# Patient Record
Sex: FEMALE | Race: WHITE | Employment: OTHER | URBAN - METROPOLITAN AREA
[De-identification: names, ages, dates, MRNs, and addresses within clinical notes are randomized per-mention and may not be internally consistent; named-entity substitution may affect disease eponyms.]

---

## 2018-05-08 ENCOUNTER — MEDICAL CORRESPONDENCE (OUTPATIENT)
Dept: HEALTH INFORMATION MANAGEMENT | Facility: CLINIC | Age: 73
End: 2018-05-08

## 2018-05-08 DIAGNOSIS — I31.39 PERICARDIAL EFFUSION: Primary | ICD-10-CM

## 2018-05-08 NOTE — PROGRESS NOTES
Ordering provider Eligio Gregg MD from [facility]. Please fax results to [phone]

## 2018-05-31 ENCOUNTER — APPOINTMENT (OUTPATIENT)
Dept: ULTRASOUND IMAGING | Facility: CLINIC | Age: 73
End: 2018-05-31
Attending: EMERGENCY MEDICINE
Payer: COMMERCIAL

## 2018-05-31 ENCOUNTER — HOSPITAL ENCOUNTER (EMERGENCY)
Facility: CLINIC | Age: 73
Discharge: HOME | End: 2018-05-31
Attending: EMERGENCY MEDICINE | Admitting: EMERGENCY MEDICINE
Payer: COMMERCIAL

## 2018-05-31 ENCOUNTER — TELEPHONE (OUTPATIENT)
Dept: FAMILY MEDICINE | Facility: CLINIC | Age: 73
End: 2018-05-31

## 2018-05-31 VITALS
TEMPERATURE: 97.8 F | HEART RATE: 72 BPM | DIASTOLIC BLOOD PRESSURE: 68 MMHG | HEIGHT: 60 IN | BODY MASS INDEX: 25.01 KG/M2 | WEIGHT: 127.4 LBS | SYSTOLIC BLOOD PRESSURE: 122 MMHG | OXYGEN SATURATION: 94 % | RESPIRATION RATE: 14 BRPM

## 2018-05-31 DIAGNOSIS — I82.402 ACUTE DEEP VEIN THROMBOSIS (DVT) OF LEFT LOWER EXTREMITY, UNSPECIFIED VEIN (H): ICD-10-CM

## 2018-05-31 PROCEDURE — 99284 EMERGENCY DEPT VISIT MOD MDM: CPT | Mod: 25 | Performed by: EMERGENCY MEDICINE

## 2018-05-31 PROCEDURE — 93970 EXTREMITY STUDY: CPT

## 2018-05-31 PROCEDURE — 99284 EMERGENCY DEPT VISIT MOD MDM: CPT | Mod: Z6 | Performed by: EMERGENCY MEDICINE

## 2018-05-31 RX ORDER — MULTIVIT WITH MINERALS/LUTEIN
1000 TABLET ORAL
COMMUNITY

## 2018-05-31 RX ORDER — POTASSIUM CHLORIDE 1500 MG/1
20 TABLET, EXTENDED RELEASE ORAL DAILY
COMMUNITY
Start: 2018-05-27 | End: 2018-06-01

## 2018-05-31 RX ORDER — CARVEDILOL 12.5 MG/1
12.5 TABLET ORAL
COMMUNITY
Start: 2018-05-27

## 2018-05-31 RX ORDER — KETOROLAC TROMETHAMINE 5 MG/ML
1 SOLUTION OPHTHALMIC PRN
COMMUNITY

## 2018-05-31 RX ORDER — FUROSEMIDE 40 MG
40 TABLET ORAL DAILY
COMMUNITY
Start: 2018-05-27 | End: 2018-06-01

## 2018-05-31 RX ORDER — ACETAMINOPHEN 325 MG/1
325-650 TABLET ORAL
COMMUNITY
Start: 2014-10-29

## 2018-05-31 RX ORDER — DOCOSANOL 100 MG/G
CREAM TOPICAL PRN
COMMUNITY

## 2018-05-31 RX ORDER — LANOLIN ALCOHOL/MO/W.PET/CERES
3 CREAM (GRAM) TOPICAL
COMMUNITY

## 2018-05-31 RX ORDER — FEXOFENADINE HCL 60 MG/1
60 TABLET, FILM COATED ORAL DAILY PRN
COMMUNITY

## 2018-05-31 RX ORDER — FLUTICASONE PROPIONATE 50 MCG
1 SPRAY, SUSPENSION (ML) NASAL PRN
COMMUNITY

## 2018-05-31 RX ORDER — DIPHENHYDRAMINE HCL 25 MG
25 CAPSULE ORAL
COMMUNITY

## 2018-05-31 RX ORDER — VIT C/B6/B5/MAGNESIUM/HERB 173 50-5-6-5MG
500 CAPSULE ORAL DAILY
COMMUNITY

## 2018-05-31 RX ORDER — VALACYCLOVIR HYDROCHLORIDE 1 G/1
1 TABLET, FILM COATED ORAL 2 TIMES DAILY PRN
COMMUNITY

## 2018-05-31 NOTE — ED AVS SNAPSHOT
[facility] Emergency Department    [address] [place] [address]    Phone:  [phone]    Fax:  [phone]                                       Marie C Barthel   MRN: [id number]    Department:  [facility] Emergency Department   Date of Visit:  5/31/2018           Patient Information     Date Of Birth          1945        Your diagnoses for this visit were:     Acute deep vein thrombosis (DVT) of left lower extremity, unspecified vein (H)        You were seen by Casey Oliva MD.      Follow-up Information     Follow up with cardiology and pcp.    Why:  As planned, ER follow up      24 Hour Appointment Hotline       To make an appointment at any [place] clinic, call [phone] ([phone]). If you don't have a family doctor or clinic, we will help you find one. [place] clinics are conveniently located to serve the needs of you and your family.             Review of your medicines      START taking        Dose / Directions Last dose taken    rivaroxaban ANTICOAGULANT 15 MG Tabs tablet   Commonly known as:  XARELTO   Dose:  15 mg   Quantity:  42 tablet        Take 1 tablet (15 mg) by mouth 2 times daily (with meals)   Refills:  0          Our records show that you are taking the medicines listed below. If these are incorrect, please call your family doctor or clinic.        Dose / Directions Last dose taken    acetaminophen 325 MG tablet   Commonly known as:  TYLENOL   Dose:  325-650 mg        Take 325-650 mg by mouth   Refills:  0        ascorbic acid 1000 MG Tabs   Commonly known as:  vitamin C   Dose:  1000 mg        Take 1,000 mg by mouth   Refills:  0        carvedilol 12.5 MG tablet   Commonly known as:  COREG   Dose:  12.5 mg        Take 12.5 mg by mouth   Refills:  0        diphenhydrAMINE 25 MG capsule   Commonly known as:  BENADRYL   Dose:  25 mg        Take 25 mg by mouth nightly as needed   Refills:  0        docosanol 10 % Crea cream   Commonly  known as:  ABREVA        Apply topically as needed   Refills:  0        fexofenadine 60 MG tablet   Commonly known as:  ALLEGRA   Dose:  60 mg        Take 60 mg by mouth daily as needed   Refills:  0        fluticasone 50 MCG/ACT spray   Commonly known as:  FLONASE   Dose:  1 spray        Spray 1 spray into both nostrils as needed   Refills:  0        furosemide 40 MG tablet   Commonly known as:  LASIX   Dose:  40 mg        Take 40 mg by mouth daily   Refills:  0        ketorolac 0.5 % ophthalmic solution   Commonly known as:  ACULAR   Dose:  1 drop        Place 1 drop into both eyes as needed   Refills:  0        melatonin 3 MG tablet   Dose:  3 mg        Take 3 mg by mouth nightly as needed   Refills:  0        potassium chloride SA 20 MEQ CR tablet   Commonly known as:  K-DUR/KLOR-CON M   Dose:  20 mEq        Take 20 mEq by mouth daily   Refills:  0        PRESERVISION AREDS PO   Dose:  1 tablet        Take 1 tablet by mouth daily   Refills:  0        PROBIOTIC DAILY PO   Dose:  1 tablet        Take 1 tablet by mouth as needed   Refills:  0        Turmeric 500 MG Caps   Dose:  500 mg        Take 500 mg by mouth daily   Refills:  0        valACYclovir 1000 mg tablet   Commonly known as:  VALTREX   Dose:  1 g        Take 1 g by mouth 2 times daily as needed   Refills:  0        VALERIAN PO        Take by mouth as needed (sleep)   Refills:  0        VITAMIN D-1000 MAX ST 1000 units Tabs   Dose:  1000 Units   Generic drug:  cholecalciferol        Take 1,000 Units by mouth   Refills:  0        ZINC PO   Dose:  1 capsule        Take 1 capsule by mouth daily   Refills:  0                Prescriptions were sent or printed at these locations (1 Prescription)                   [facility] - [place], [place] - [address]   [address] [facility] [address]    Telephone:  [phone]   Fax:  [phone]   Hours:                  E-Prescribed (1 of 1)         rivaroxaban ANTICOAGULANT (XARELTO) 15 MG TABS tablet           "      Procedures and tests performed during your visit     US Lower Extremity Venous Duplex Bilateral      Orders Needing Specimen Collection     None      Pending Results     No orders found from 2018 to 2018.            Pending Culture Results     No orders found from 2018 to 2018.            Pending Results Instructions     If you had any lab results that were not finalized at the time of your Discharge, you can call the ED Lab Result RN at [phone]. You will be contacted by this team for any positive Lab results or changes in treatment. The nurses are available 7 days a week from 10A to 6:30P.  You can leave a message 24 hours per day and they will return your call.        Thank you for choosing [facility]       Thank you for choosing [facility] for your care. Our goal is always to provide you with excellent care. Hearing back from our patients is one way we can continue to improve our services. Please take a few minutes to complete the written survey that you may receive in the mail after you visit with us. Thank you!        [employer] Information     [employer] lets you send messages to your doctor, view your test results, renew your prescriptions, schedule appointments and more. To sign up, go to [email or web address] . Click on \"Log in\" on the left side of the screen, which will take you to the Welcome page. Then click on \"Sign up Now\" on the right side of the page.     You will be asked to enter the access code listed below, as well as some personal information. Please follow the directions to create your username and password.     Your access code is: [email or web address]  Expires: 2018  1:27 PM     Your access code will  in 90 days. If you need help or a new code, please call your [facility] clinic or [phone].        Care EveryWhere ID     This is your Care EveryWhere ID. This could be used by other organizations to access your [facility] medical records  [id number]        Equal Access " to Services     JENNY ELLIOTT : Kristi Cortes, kevin christianson, arianne mccrary. So [facility] [phone].    ATENCIÓN: Si habla español, tiene a stuart disposición servicios gratuitos de asistencia lingüística. Llame al [phone].    We comply with applicable federal civil rights laws and [place] laws. We do not discriminate on the basis of race, color, national origin, age, disability, sex, sexual orientation, or gender identity.            After Visit Summary       This is your record. Keep this with you and show to your community pharmacist(s) and doctor(s) at your next visit.

## 2018-05-31 NOTE — ED PROVIDER NOTES
History     Chief Complaint   Patient presents with     Leg Pain     HPI  Marie C Barthel is a 72 year old female who resents the emergency department complaining of bilateral calf pain.  One week ago she had aortic valve and proximal aortic arch replacement secondary to an aortic aneurysm.  The surgery went well and she is tolerated her postoperative symptoms quite well.  She is does not have any chest pain and is getting around quite well.  She has been fairly active considering her recent surgery.  In the last couple of days she developed left calf pain followed by right calf pain.  Her daughters taking care of her and thought she felt a ropelike mass bilaterally.  She has not had any swelling or discoloration of her legs.  She has not had any sensory changes.  She has no abdominal pain, nausea or vomiting.  She has not had any fever or other new symptoms.  They are here because they are concerned about a clot.  She is not on any blood thinners.    Problem List:    There are no active problems to display for this patient.       Past Medical History:    Past Medical History:   Diagnosis Date     Brain aneurysm 2012     Giant cell arteritis (H)        Past Surgical History:    Past Surgical History:   Procedure Laterality Date     CRANIOTOMY, REPAIR ANEURYSM, COMBINED  2012    [facility]      [facility]       SUSANNA  2014    [facility]      Replacement of aortic valve       Replacement of ascending aortic arch       SURGICAL HISTORY OF -       eye surgery for strabismus     TONSILLECTOMY         Family History:    No family history on file.    Social History:  Marital Status:    Social History   Substance Use Topics     Smoking status: Never Smoker     Smokeless tobacco: Never Used     Alcohol use Not on file        Medications:      acetaminophen (TYLENOL) 325 MG tablet   ascorbic acid (VITAMIN C) 1000 MG TABS   carvedilol (COREG) 12.5 MG tablet   cholecalciferol (VITAMIN D-1000 MAX ST) 1000 units TABS   furosemide (LASIX)  40 MG tablet   Multiple Vitamins-Minerals (PRESERVISION AREDS PO)   Multiple Vitamins-Minerals (ZINC PO)   potassium chloride SA (K-DUR/KLOR-CON M) 20 MEQ CR tablet   Probiotic Product (PROBIOTIC DAILY PO)   rivaroxaban ANTICOAGULANT (XARELTO) 15 MG TABS tablet   Turmeric 500 MG CAPS   diphenhydrAMINE (BENADRYL) 25 MG capsule   docosanol (ABREVA) 10 % CREA cream   fexofenadine (ALLEGRA) 60 MG tablet   fluticasone (FLONASE) 50 MCG/ACT spray   ketorolac (ACULAR) 0.5 % ophthalmic solution   melatonin 3 MG tablet   valACYclovir (VALTREX) 1000 mg tablet   VALERIAN PO         Review of Systems   All other systems reviewed and are negative.      Physical Exam   BP: 114/65  Pulse: 77  Temp: 97.8  F (36.6  C)  Resp: 14  Height: 152.4 cm (5')  Weight: 57.8 kg (127 lb 6.4 oz)  SpO2: 93 %      Physical Exam   Constitutional: She appears well-developed and well-nourished. No distress.   HENT:   Head: Normocephalic and atraumatic.   Nose: Nose normal.   Mouth/Throat: Oropharynx is clear and moist. No oropharyngeal exudate.   Eyes: Conjunctivae and EOM are normal. Right eye exhibits no discharge. Left eye exhibits no discharge. No scleral icterus.   Neck: Normal range of motion. Neck supple.   Cardiovascular: Normal rate and normal heart sounds.  Exam reveals no gallop and no friction rub.    No murmur heard.  Pulmonary/Chest: Effort normal and breath sounds normal. No stridor. No respiratory distress.   Abdominal: Soft. She exhibits no distension. There is no tenderness. There is no rebound.   Musculoskeletal: Normal range of motion. She exhibits tenderness. She exhibits no edema or deformity.   Mild bilateral calf tenderness without swelling.  There is a suggestion of a subtle ropelike firmness to the right calf but nothing palpable on the left calf.   Neurological: She is alert. No cranial nerve deficit. Coordination normal.   Skin: Skin is warm and dry. No rash noted. She is not diaphoretic. No erythema. No pallor.    Psychiatric: She has a normal mood and affect. Her behavior is normal.   Nursing note and vitals reviewed.      ED Course     ED Course     Procedures                   Results for orders placed or performed during the hospital encounter of 05/31/18 (from the past 24 hour(s))   US Lower Extremity Venous Duplex Bilateral    Narrative    VENOUS ULTRASOUND BILATERAL LEG(S)  5/31/2018 12:03 PM     HISTORY: Calf pain post op aortic valve and arch replacement.     COMPARISON: None.    FINDINGS: Examination of the deep veins with graded compression and  color flow Doppler with spectral wave form analysis was performed.    Left: There is deep vein thrombosis in the left popliteal and  posterior tibial veins. No deep vein thrombosis noted in the left  common femoral or femoral veins.    Right: Images show no evidence of thrombus in the right common femoral  vein, femoral vein, popliteal vein or calf veins.      Impression    IMPRESSION:   1. Deep vein thrombosis in the left popliteal and posterior tibial  veins.  2. No deep vein thrombosis in the right lower extremity.    LACHELLE SLADE, DO       Medications - No data to display    Assessments & Plan (with Medical Decision Making)  72-year-old postoperative approximately 1 week for aortic valve and proximal aortic arch repair presents with leg pain and a popliteal DVT on ultrasound.  I had a long discussion with the patient and her daughter regarding the risks, treatment options and follow-up regarding this DVT.  I discussed the case with her cardiothoracic surgeon who agreed with the management outlined below.  Options include treating her with Lovenox and Coumadin, Xarelto, Pradaxa or apixaban.  After risks and benefits were discussed with the patient including reversal agents, storeability and cost we decided to proceed with Xarelto.  She understands there are significant risks to taking this blood thinner but also significant risks to no treatment.  I do not think  no treatment is reasonable option given the presence of the DVT.  She is artery been quite active.  She does not have a history of thromboembolic disease and therefore I think genetic disorder is extremely unlikely.  The initial 21 day prescription was sent electronically to [facility] locally per the patient request.  All questions were answered.     I have reviewed the nursing notes.    I have reviewed the findings, diagnosis, plan and need for follow up with the patient.      Discharge Medication List as of 5/31/2018  1:27 PM      START taking these medications    Details   rivaroxaban ANTICOAGULANT (XARELTO) 15 MG TABS tablet Take 1 tablet (15 mg) by mouth 2 times daily (with meals), Disp-42 tablet, R-0, E-Prescribe             Final diagnoses:   Acute deep vein thrombosis (DVT) of left lower extremity, unspecified vein (H)       5/31/2018   [facility] EMERGENCY DEPARTMENT     Casey Oliva MD  05/31/18 [id number]

## 2018-05-31 NOTE — ED NOTES
Bedside report received from Collette MOODY RN at this time and care assumed. Pain is minimal 1/10 at rest. Waiting for MD orders.

## 2018-05-31 NOTE — TELEPHONE ENCOUNTER
Patient is calling to report she is a family friend of Dr. Welch's and is in town at someone else's house.  She had open heart surgery on 5/23/18 and is having bilateral calf pain with some swelling.  She is denying shortness of breath, chest pain, cold or blue foot, lack of pulse in either foot.  She noticed the pain 2 days ago on one side, then it moved to both legs.  She states she is not on any blood thinners or Plavix.    RN Shanita Cunningham (Dr. Welch's MA) to ask about seeing patient.  Dr. Welch left to make a phone call and patient said to just call her back with a plan.  Cell is [phone].  Routing to PCP for further advice.  Patient should be seen in 2-4 hours.    Dory Milan RN    Telephone Triage Protocols for Nurses, 5th edition - Alexia Cavazos: Leg pain/swelling

## 2018-05-31 NOTE — ED AVS SNAPSHOT
[facility] Emergency Department    [address] [place] [address]    Phone:  [phone]    Fax:  [phone]                                       Marie C Barthel   MRN: [id number]    Department:  [facility] Emergency Department   Date of Visit:  5/31/2018           After Visit Summary Signature Page     I have received my discharge instructions, and my questions have been answered. I have discussed any challenges I see with this plan with the nurse or doctor.    ..........................................................................................................................................  Patient/Patient Representative Signature      ..........................................................................................................................................  Patient Representative Print Name and Relationship to Patient    ..................................................               ................................................  Date                                            Time    ..........................................................................................................................................  Reviewed by Signature/Title    ...................................................              ..............................................  Date                                                            Time

## 2018-05-31 NOTE — ED TRIAGE NOTES
Pt had replacement of her Aortic Arch and Replacement of her aortic valve 8 days ago and now has bilateral calf pain. Is not on any blood thinners.

## 2019-04-09 ENCOUNTER — DOCUMENTATION ONLY (OUTPATIENT)
Dept: OTHER | Facility: CLINIC | Age: 74
End: 2019-04-09